# Patient Record
Sex: FEMALE | Race: WHITE | URBAN - METROPOLITAN AREA
[De-identification: names, ages, dates, MRNs, and addresses within clinical notes are randomized per-mention and may not be internally consistent; named-entity substitution may affect disease eponyms.]

---

## 2022-07-15 ENCOUNTER — OFFICE VISIT (OUTPATIENT)
Dept: URBAN - METROPOLITAN AREA CLINIC 128 | Facility: CLINIC | Age: 62
End: 2022-07-15
Payer: COMMERCIAL

## 2022-07-15 VITALS
TEMPERATURE: 97.8 F | BODY MASS INDEX: 29.26 KG/M2 | WEIGHT: 171.4 LBS | HEART RATE: 72 BPM | DIASTOLIC BLOOD PRESSURE: 84 MMHG | HEIGHT: 64 IN | SYSTOLIC BLOOD PRESSURE: 138 MMHG

## 2022-07-15 DIAGNOSIS — R17 ELEVATED BILIRUBIN: ICD-10-CM

## 2022-07-15 DIAGNOSIS — Z85.3 HISTORY OF BREAST CANCER: ICD-10-CM

## 2022-07-15 DIAGNOSIS — Z12.11 COLON CANCER SCREENING: ICD-10-CM

## 2022-07-15 PROCEDURE — 99203 OFFICE O/P NEW LOW 30 MIN: CPT | Performed by: INTERNAL MEDICINE

## 2022-07-15 PROCEDURE — 99243 OFF/OP CNSLTJ NEW/EST LOW 30: CPT | Performed by: INTERNAL MEDICINE

## 2022-07-15 NOTE — HPI-TODAY'S VISIT:
pt referred by dr. alex Santos for elevated bilirubin.  a copy of this note will be sent to referring physician pt with hx of breast cancer s/p mastectomy in 2016 recent labs for check up with dr. santos was notable for elevated bilirubin she notes labs in 6/2022 with alt/ast of 17/15.  total bili of 1.7 h/h normal at 12.3/37.5 repeat hepatic function test in 6/2022 with bili of 1.6.  1.1 indirect in 6/23/2022 pt notes normal appetite no n/v no dysphagia stable weight normal stools no abdominal distention no jaundice or bleeding issues no tattoos no hx of iv drug use no etoh use.  no other complaints  last colonoscopy in 2016 with no polyps.  no fam hx of colon cancer or polyps.

## 2022-07-22 ENCOUNTER — TELEPHONE ENCOUNTER (OUTPATIENT)
Dept: URBAN - METROPOLITAN AREA CLINIC 80 | Facility: CLINIC | Age: 62
End: 2022-07-22

## 2022-07-22 ENCOUNTER — TELEPHONE ENCOUNTER (OUTPATIENT)
Dept: URBAN - METROPOLITAN AREA CLINIC 23 | Facility: CLINIC | Age: 62
End: 2022-07-22

## 2022-07-26 ENCOUNTER — TELEPHONE ENCOUNTER (OUTPATIENT)
Dept: URBAN - METROPOLITAN AREA CLINIC 92 | Facility: CLINIC | Age: 62
End: 2022-07-26

## 2022-07-28 ENCOUNTER — LAB OUTSIDE AN ENCOUNTER (OUTPATIENT)
Dept: URBAN - METROPOLITAN AREA CLINIC 80 | Facility: CLINIC | Age: 62
End: 2022-07-28

## 2022-07-28 ENCOUNTER — TELEPHONE ENCOUNTER (OUTPATIENT)
Dept: URBAN - METROPOLITAN AREA CLINIC 118 | Facility: CLINIC | Age: 62
End: 2022-07-28

## 2022-08-09 ENCOUNTER — LAB OUTSIDE AN ENCOUNTER (OUTPATIENT)
Dept: URBAN - METROPOLITAN AREA CLINIC 80 | Facility: CLINIC | Age: 62
End: 2022-08-09

## 2022-08-09 LAB
CREATININE POC: 0.9
PERFORMING LAB: (no result)

## 2022-08-15 ENCOUNTER — TELEPHONE ENCOUNTER (OUTPATIENT)
Dept: URBAN - METROPOLITAN AREA CLINIC 92 | Facility: CLINIC | Age: 62
End: 2022-08-15

## 2022-08-16 ENCOUNTER — TELEPHONE ENCOUNTER (OUTPATIENT)
Dept: URBAN - METROPOLITAN AREA CLINIC 19 | Facility: CLINIC | Age: 62
End: 2022-08-16

## 2022-10-19 ENCOUNTER — OFFICE VISIT (OUTPATIENT)
Dept: URBAN - METROPOLITAN AREA CLINIC 19 | Facility: CLINIC | Age: 62
End: 2022-10-19

## 2022-11-11 ENCOUNTER — WEB ENCOUNTER (OUTPATIENT)
Dept: URBAN - METROPOLITAN AREA CLINIC 19 | Facility: CLINIC | Age: 62
End: 2022-11-11

## 2022-11-11 ENCOUNTER — OFFICE VISIT (OUTPATIENT)
Dept: URBAN - METROPOLITAN AREA CLINIC 19 | Facility: CLINIC | Age: 62
End: 2022-11-11
Payer: COMMERCIAL

## 2022-11-11 VITALS
TEMPERATURE: 98.3 F | BODY MASS INDEX: 28.68 KG/M2 | DIASTOLIC BLOOD PRESSURE: 89 MMHG | HEART RATE: 74 BPM | SYSTOLIC BLOOD PRESSURE: 124 MMHG | WEIGHT: 168 LBS | HEIGHT: 64 IN

## 2022-11-11 DIAGNOSIS — Z12.11 SCREEN FOR COLON CANCER: ICD-10-CM

## 2022-11-11 DIAGNOSIS — E80.6 HYPERBILIRUBINEMIA: ICD-10-CM

## 2022-11-11 DIAGNOSIS — K31.89 GASTRIC WALL THICKENING: ICD-10-CM

## 2022-11-11 DIAGNOSIS — Z85.3 HISTORY OF BREAST CANCER: ICD-10-CM

## 2022-11-11 PROCEDURE — 99213 OFFICE O/P EST LOW 20 MIN: CPT | Performed by: INTERNAL MEDICINE

## 2022-11-11 NOTE — HPI-TODAY'S VISIT:
7/15/22 (Dr. Darrell Rebolledo): pt referred by dr. alex Santos for elevated bilirubin.  a copy of this note will be sent to referring physician pt with hx of breast cancer s/p mastectomy in 2016 recent labs for check up with dr. santos was notable for elevated bilirubin she notes labs in 6/2022 with alt/ast of 17/15.  total bili of 1.7 h/h normal at 12.3/37.5 repeat hepatic function test in 6/2022 with bili of 1.6.  1.1 indirect in 6/23/2022 pt notes normal appetite no n/v no dysphagia stable weight normal stools no abdominal distention no jaundice or bleeding issues no tattoos no hx of iv drug use no etoh use.  no other complaints  last colonoscopy in 2016 with no polyps.  no fam hx of colon cancer or polyps.  11/11/22: Patient presents for reevaluation of her elevated bilirubin. After seeing Dr. Rebolledo, the patient underwent her recommended MRI, but she did not get the recommended labs to rule out PBC and other liver conditions. On 7/15/22, her MRI showed multiple benign hemangiomas in the liver, which had been present since 2011. She had a follow-up ultrasound on 7/28/22, which showed some sludge in the gallbladder but interestingly enough, did not mention any masses. Her last bilirubin of record was drawn on 6/23/22 and was 1.7. While I suspect a benign condition, such as Gilbert's syndrome, I told patient today that she should get the necessary labs to rule out PBC and autoimmune hepatitis. Of note, her MRI did mention that there was mild gastric wall thickening, but that this was probably due to underdistension. She has no gastrointestinal discomfort at this time. She is due for colonoscopy in 2026 for screening purposes, based on Dr. Castaneda's last note.

## 2022-11-15 ENCOUNTER — OFFICE VISIT (OUTPATIENT)
Dept: URBAN - METROPOLITAN AREA CLINIC 19 | Facility: CLINIC | Age: 62
End: 2022-11-15

## 2022-11-23 LAB
ACTIN (SMOOTH MUSCLE) ANTIBODY (IGG): <20
ALBUMIN/GLOBULIN RATIO: 2
ALBUMIN: 4.9
ALKALINE PHOSPHATASE: 61
ALT (SGPT): 20
ANA SCREEN, IFA: NEGATIVE
AST (SGOT): 16
BILIRUBIN, DIRECT: 0.3
BILIRUBIN, INDIRECT: 1.3
BILIRUBIN, TOTAL: 1.6
GLOBULIN: 2.5
IGG, SUBCLASS 4: 22.6
IMMUNOGLOBULIN G, QN, SERUM: 1090
LKM-1 ANTIBODY (IGG): <=20
MITOCHONDRIAL (M2) ANTIBODY: <=20
PROTEIN, TOTAL: 7.4

## 2023-02-07 ENCOUNTER — DASHBOARD ENCOUNTERS (OUTPATIENT)
Age: 63
End: 2023-02-07

## 2023-02-08 ENCOUNTER — WEB ENCOUNTER (OUTPATIENT)
Dept: URBAN - METROPOLITAN AREA CLINIC 19 | Facility: CLINIC | Age: 63
End: 2023-02-08

## 2023-02-08 ENCOUNTER — OFFICE VISIT (OUTPATIENT)
Dept: URBAN - METROPOLITAN AREA CLINIC 19 | Facility: CLINIC | Age: 63
End: 2023-02-08
Payer: COMMERCIAL

## 2023-02-08 VITALS
SYSTOLIC BLOOD PRESSURE: 124 MMHG | WEIGHT: 164 LBS | DIASTOLIC BLOOD PRESSURE: 78 MMHG | HEIGHT: 64 IN | BODY MASS INDEX: 28 KG/M2 | TEMPERATURE: 98.2 F

## 2023-02-08 DIAGNOSIS — E80.6 HYPERBILIRUBINEMIA: ICD-10-CM

## 2023-02-08 DIAGNOSIS — Z85.3 HISTORY OF BREAST CANCER: ICD-10-CM

## 2023-02-08 DIAGNOSIS — K31.89 GASTRIC WALL THICKENING: ICD-10-CM

## 2023-02-08 DIAGNOSIS — Z12.11 SCREEN FOR COLON CANCER: ICD-10-CM

## 2023-02-08 DIAGNOSIS — R10.11 RUQ ABDOMINAL PAIN: ICD-10-CM

## 2023-02-08 PROBLEM — 429087003: Status: ACTIVE | Noted: 2022-07-15

## 2023-02-08 PROBLEM — 14783006: Status: ACTIVE | Noted: 2022-07-26

## 2023-02-08 PROBLEM — 275978004 SCREENING FOR MALIGNANT NEOPLASM OF COLON: Status: ACTIVE | Noted: 2022-11-13

## 2023-02-08 PROCEDURE — 99213 OFFICE O/P EST LOW 20 MIN: CPT | Performed by: INTERNAL MEDICINE

## 2023-02-08 RX ORDER — HYOSCYAMINE SULFATE 0.12 MG/1
1 TABLET UNDER THE TONGUE AND ALLOW TO DISSOLVE  AS NEEDED TABLET SUBLINGUAL THREE TIMES A DAY
Qty: 180 | Refills: 3 | OUTPATIENT
Start: 2023-02-08 | End: 2024-02-03

## 2023-02-08 NOTE — HPI-TODAY'S VISIT:
pt referred by dr. alex Santos for elevated bilirubin.  a copy of this note will be sent to referring physician last seen in 11/2022 pt with hx of breast cancer s/p mastectomy in 2016 recent labs for check up with dr. santos was notable for elevated bilirubin she notes labs in 6/2022 with alt/ast of 17/15.  total bili of 1.7 repeat hepatic function test in 6/2022 with bili of 1.6.  1.1 indirect in 6/23/2022 repeat labs in 11/2022 with normal alt/ast and total bili of 1.6 ultrasound on 7/28/22, which showed some sludge in the gallbladder but interestingly enough, did not mention any masses On 7/15/22, her MRI showed multiple benign hemangiomas in the liver, which had been present since 2011. labs with normal ama, asma, igg4, anti-lkm.    here for follow up has been well normal appetite normal energy stable weight she did finalize her divorce after 5 years and ontes improved stress normal stools no jaundice or bleeding issues  no other complaints  last colonoscopy in 2016 with no polyps.  no fam hx of colon cancer or polyps.

## 2023-03-13 ENCOUNTER — LAB OUTSIDE AN ENCOUNTER (OUTPATIENT)
Dept: URBAN - METROPOLITAN AREA CLINIC 80 | Facility: CLINIC | Age: 63
End: 2023-03-13

## 2025-04-16 ENCOUNTER — OFFICE VISIT (OUTPATIENT)
Dept: URBAN - METROPOLITAN AREA CLINIC 19 | Facility: CLINIC | Age: 65
End: 2025-04-16
Payer: COMMERCIAL

## 2025-04-16 DIAGNOSIS — R10.11 RUQ ABDOMINAL PAIN: ICD-10-CM

## 2025-04-16 DIAGNOSIS — K31.89 GASTRIC WALL THICKENING: ICD-10-CM

## 2025-04-16 DIAGNOSIS — Z12.11 SCREEN FOR COLON CANCER: ICD-10-CM

## 2025-04-16 DIAGNOSIS — E80.6 HYPERBILIRUBINEMIA: ICD-10-CM

## 2025-04-16 DIAGNOSIS — D50.0 IRON DEFICIENCY ANEMIA DUE TO CHRONIC BLOOD LOSS: ICD-10-CM

## 2025-04-16 DIAGNOSIS — Z85.3 HISTORY OF BREAST CANCER: ICD-10-CM

## 2025-04-16 PROBLEM — 724556004: Status: ACTIVE | Noted: 2025-04-16

## 2025-04-16 PROCEDURE — 99214 OFFICE O/P EST MOD 30 MIN: CPT | Performed by: INTERNAL MEDICINE

## 2025-04-16 RX ORDER — SODIUM SULFATE, MAGNESIUM SULFATE, AND POTASSIUM CHLORIDE 17.75; 2.7; 2.25 G/1; G/1; G/1
12 TABLETS TABLET ORAL
Qty: 24 TABLETS | Refills: 0 | OUTPATIENT
Start: 2025-04-16 | End: 2025-04-17

## 2025-04-16 NOTE — HPI-TODAY'S VISIT:
pt Referred by Dr. Elizabeth Santos for iron deficiency anemia.  A copy of this note will be sent to referring physician last seen in 2/2023 pt with hx of breast cancer s/p mastectomy in 2016 recent labs for check up with dr. santos was notable for elevated bilirubin she notes labs in 6/2022 with alt/ast of 17/15.  total bili of 1.7 repeat hepatic function test in 6/2022 with bili of 1.6.  1.1 indirect in 6/23/2022 repeat labs in 11/2022 with normal alt/ast and total bili of 1.6 ultrasound on 7/28/22, which showed some sludge in the gallbladder but interestingly enough, did not mention any masses On 7/15/22, her MRI showed multiple benign hemangiomas in the liver, which had been present since 2011. labs with normal ama, asma, igg4, anti-lkm.   prior hx of bladder cancer here for LM  was well but complaining of fatigue had labs with dr. ponce with noted iron def anemia.   labs in 2/2025 with hgb of 11.7, hct of 31. prior ferriting of 13 in 8/2024 and on iron replacement no prior egd last colon was normal in 2016 denies any overt gi bleed notes rlq pain that is dull and constant unrelated to eating, drinking, or bowel movements denies any unexplainted weight loss recently dx'd with cll with dr. opnce last year aon on treatment now.   normal appetite normal stools no jaundice or bleeding issues  no other complaints  last colonoscopy in 2016 with no polyps.  no fam hx of colon cancer or polyps.

## 2025-05-08 ENCOUNTER — TELEPHONE ENCOUNTER (OUTPATIENT)
Dept: URBAN - METROPOLITAN AREA CLINIC 80 | Facility: CLINIC | Age: 65
End: 2025-05-08

## 2025-05-08 ENCOUNTER — CLAIMS CREATED FROM THE CLAIM WINDOW (OUTPATIENT)
Dept: URBAN - METROPOLITAN AREA CLINIC 4 | Facility: CLINIC | Age: 65
End: 2025-05-08
Payer: COMMERCIAL

## 2025-05-08 ENCOUNTER — OFFICE VISIT (OUTPATIENT)
Dept: URBAN - METROPOLITAN AREA SURGERY CENTER 31 | Facility: SURGERY CENTER | Age: 65
End: 2025-05-08
Payer: COMMERCIAL

## 2025-05-08 DIAGNOSIS — K63.5 BENIGN COLON POLYP: ICD-10-CM

## 2025-05-08 DIAGNOSIS — K31.89 OTHER DISEASES OF STOMACH AND DUODENUM: ICD-10-CM

## 2025-05-08 DIAGNOSIS — D12.3 ADENOMA OF TRANSVERSE COLON: ICD-10-CM

## 2025-05-08 DIAGNOSIS — K22.70 BARRETT'S ESOPHAGUS WITHOUT DYSPLASIA: ICD-10-CM

## 2025-05-08 DIAGNOSIS — K22.70 BARRETT ESOPHAGUS: ICD-10-CM

## 2025-05-08 DIAGNOSIS — K51.40 INFLAMMATORY POLYPS OF COLON WITHOUT COMPLICATIONS: ICD-10-CM

## 2025-05-08 DIAGNOSIS — K29.70 GASTRITIS, UNSPECIFIED, WITHOUT BLEEDING: ICD-10-CM

## 2025-05-08 DIAGNOSIS — D12.2 ADENOMA OF ASCENDING COLON: ICD-10-CM

## 2025-05-08 DIAGNOSIS — K29.60 ADENOPAPILLOMATOSIS GASTRICA: ICD-10-CM

## 2025-05-08 DIAGNOSIS — D12.3 BENIGN NEOPLASM OF TRANSVERSE COLON: ICD-10-CM

## 2025-05-08 DIAGNOSIS — D12.2 BENIGN NEOPLASM OF ASCENDING COLON: ICD-10-CM

## 2025-05-08 DIAGNOSIS — D50.0 1. ANEMIA, IRON DEFICIENCY FROM CHRONIC BLOOD LOSS:: ICD-10-CM

## 2025-05-08 PROCEDURE — 88305 TISSUE EXAM BY PATHOLOGIST: CPT | Performed by: PATHOLOGY

## 2025-05-08 PROCEDURE — 45385 COLONOSCOPY W/LESION REMOVAL: CPT | Performed by: INTERNAL MEDICINE

## 2025-05-08 PROCEDURE — 43239 EGD BIOPSY SINGLE/MULTIPLE: CPT | Performed by: INTERNAL MEDICINE

## 2025-05-08 PROCEDURE — 00813 ANES UPR LWR GI NDSC PX: CPT | Performed by: STUDENT IN AN ORGANIZED HEALTH CARE EDUCATION/TRAINING PROGRAM

## 2025-05-28 ENCOUNTER — OFFICE VISIT (OUTPATIENT)
Dept: URBAN - METROPOLITAN AREA CLINIC 127 | Facility: CLINIC | Age: 65
End: 2025-05-28

## 2025-05-28 ENCOUNTER — TELEPHONE ENCOUNTER (OUTPATIENT)
Dept: URBAN - METROPOLITAN AREA CLINIC 128 | Facility: CLINIC | Age: 65
End: 2025-05-28